# Patient Record
Sex: MALE | Race: WHITE | NOT HISPANIC OR LATINO
[De-identification: names, ages, dates, MRNs, and addresses within clinical notes are randomized per-mention and may not be internally consistent; named-entity substitution may affect disease eponyms.]

---

## 2017-02-08 ENCOUNTER — TRANSCRIPTION ENCOUNTER (OUTPATIENT)
Age: 27
End: 2017-02-08

## 2017-04-13 PROBLEM — Z00.00 ENCOUNTER FOR PREVENTIVE HEALTH EXAMINATION: Status: ACTIVE | Noted: 2017-04-13

## 2017-05-08 ENCOUNTER — APPOINTMENT (OUTPATIENT)
Dept: VASCULAR SURGERY | Facility: CLINIC | Age: 27
End: 2017-05-08

## 2017-05-08 VITALS — BODY MASS INDEX: 28.14 KG/M2 | HEIGHT: 69 IN | WEIGHT: 190 LBS

## 2017-05-08 DIAGNOSIS — Z86.69 PERSONAL HISTORY OF OTHER DISEASES OF THE NERVOUS SYSTEM AND SENSE ORGANS: ICD-10-CM

## 2017-05-08 RX ORDER — ACETAMINOPHEN 325 MG/1
325 TABLET, FILM COATED ORAL
Refills: 0 | Status: ACTIVE | COMMUNITY

## 2017-05-08 RX ORDER — CLONAZEPAM 0.5 MG/1
0.5 TABLET ORAL
Refills: 0 | Status: ACTIVE | COMMUNITY

## 2017-05-08 RX ORDER — DIVALPROEX SODIUM 125 MG
125 CAPSULE, DELAYED RELEASE SPRINKLE ORAL
Refills: 0 | Status: ACTIVE | COMMUNITY

## 2017-12-14 ENCOUNTER — APPOINTMENT (OUTPATIENT)
Dept: VASCULAR SURGERY | Facility: CLINIC | Age: 27
End: 2017-12-14
Payer: MEDICARE

## 2017-12-14 PROCEDURE — 99212 OFFICE O/P EST SF 10 MIN: CPT

## 2018-01-18 ENCOUNTER — APPOINTMENT (OUTPATIENT)
Dept: VASCULAR SURGERY | Facility: CLINIC | Age: 28
End: 2018-01-18
Payer: MEDICARE

## 2018-01-18 VITALS — HEIGHT: 68 IN

## 2018-01-18 PROCEDURE — 99212 OFFICE O/P EST SF 10 MIN: CPT

## 2018-05-07 ENCOUNTER — APPOINTMENT (OUTPATIENT)
Dept: VASCULAR SURGERY | Facility: CLINIC | Age: 28
End: 2018-05-07
Payer: MEDICARE

## 2018-05-07 VITALS
DIASTOLIC BLOOD PRESSURE: 80 MMHG | BODY MASS INDEX: 34.36 KG/M2 | HEIGHT: 70 IN | WEIGHT: 240 LBS | SYSTOLIC BLOOD PRESSURE: 118 MMHG

## 2018-05-07 VITALS
BODY MASS INDEX: 34.36 KG/M2 | WEIGHT: 240 LBS | HEIGHT: 70 IN | DIASTOLIC BLOOD PRESSURE: 80 MMHG | SYSTOLIC BLOOD PRESSURE: 118 MMHG

## 2018-05-07 PROCEDURE — 99213 OFFICE O/P EST LOW 20 MIN: CPT

## 2018-06-04 ENCOUNTER — EMERGENCY (EMERGENCY)
Facility: HOSPITAL | Age: 28
LOS: 0 days | Discharge: HOME | End: 2018-06-04
Attending: EMERGENCY MEDICINE | Admitting: EMERGENCY MEDICINE

## 2018-06-04 VITALS
HEART RATE: 83 BPM | WEIGHT: 233.25 LBS | RESPIRATION RATE: 18 BRPM | OXYGEN SATURATION: 97 % | TEMPERATURE: 97 F | DIASTOLIC BLOOD PRESSURE: 62 MMHG | SYSTOLIC BLOOD PRESSURE: 125 MMHG

## 2018-06-04 DIAGNOSIS — S50.812A ABRASION OF LEFT FOREARM, INITIAL ENCOUNTER: ICD-10-CM
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
## 2018-06-04 NOTE — ED ADULT NURSE NOTE - OBJECTIVE STATEMENT
27 y.o M  s/p fall uing cane to ambulate fell going up a ramp today . Pt wearing helmet no head trauma , no LOC. c/o R knee pain

## 2018-06-04 NOTE — ED PROVIDER NOTE - OBJECTIVE STATEMENT
28 y/o male with pmhx of CP, seizures, TBI, R hemiplegia presents s/p fall. per patient, he was going up ramp, lost his balance and fell on to left side, denies head trauma, LOC; admits to wearing head helmet. Patient states his left forearm has some abrasions and is burning; denies any other pain.

## 2018-06-04 NOTE — ED ADULT NURSE NOTE - CHPI ED SYMPTOMS NEG
no numbness/no loss of consciousness/no bleeding/no tingling/no vomiting/no fever/no weakness/no confusion/no deformity

## 2018-06-04 NOTE — ED PROVIDER NOTE - PHYSICAL EXAMINATION
CONSTITUTIONAL: Well-developed; well-nourished; in no acute distress.   SKIN: +1cm abrasion to left forearm, no bleeding   EXT: Normal left forearm ROM.    NEURO: Alert, oriented, grossly unremarkable  PSYCH: Cooperative, appropriate.

## 2018-06-04 NOTE — ED PROVIDER NOTE - NS ED ROS FT
Constitutional: See HPI.  MS: +forearm abrasion; No myalgia, muscle weakness, joint pain or back pain.  Neuro: No numbness/tingling  Skin: +abrasion

## 2018-06-04 NOTE — ED PROVIDER NOTE - PROGRESS NOTE DETAILS
Attending note:  I personally evaluated the patient. I reviewed the Resident’s note (as assigned above), and agree with the findings and plan except as documented in my note.   28 y/o M with PMH of CP, TBI with residual R-sided weakness, seizures, BIB health aide for evaluation s/p mechanical slip and fall. Pt uses a cane to ambulate at baseline and today was going up a ramp, lost his balance, and fell onto his side. Pt was wearing a helmet and had no head trauma, LOC, or seizure-like activity. Pt only complains of R knee pain and multiple abrasions to his L arm. Pt has been able to ambulate and acting at his baseline since per health aide at bedside. Denies nausea or vomiting.   Exam: Pt in NAD, AAOx3, head NC/AT, normal gait, lungs CTA B/L, CV S1S2 regular, abdomen soft/NT/ND/(+)BS, ext (-) edema, no pain over arms or hands, no clavicular tenderness, (+)small abrasion to L posterior forearm, motor 5/5x4, sensation intact.   Plan: Pt stable for D/C. Attending note:  I personally evaluated the patient. I reviewed the Resident’s note (as assigned above), and agree with the findings and plan except as documented in my note.   28 y/o M with PMH of CP, TBI with residual R-sided weakness, seizures, BIB health aide for evaluation s/p mechanical slip and fall. Pt uses a cane to ambulate at baseline and today was going up a ramp, lost his balance, and fell onto his side. Pt was wearing a helmet and had no head trauma, LOC, or seizure-like activity. Pt only complains of R knee pain and multiple abrasions to his L arm. Pt has been able to ambulate and acting at his baseline since per health aide at bedside. Denies nausea or vomiting. Denies knee pain now.  Exam: Pt in NAD, alert and awake, head NC/AT, lungs CTA B/L, CV S1S2 regular, abdomen soft/NT/ND/(+)BS, ext no pain over arms or hands, no deformity, no clavicular tenderness, (+)small abrasion to L posterior forearm, ambulating at baseline.   Plan: Pt stable for D/C.

## 2018-07-19 ENCOUNTER — APPOINTMENT (OUTPATIENT)
Dept: VASCULAR SURGERY | Facility: CLINIC | Age: 28
End: 2018-07-19
Payer: MEDICARE

## 2018-07-19 VITALS — SYSTOLIC BLOOD PRESSURE: 122 MMHG | DIASTOLIC BLOOD PRESSURE: 80 MMHG

## 2018-07-19 PROCEDURE — 99212 OFFICE O/P EST SF 10 MIN: CPT

## 2018-10-16 ENCOUNTER — EMERGENCY (EMERGENCY)
Facility: HOSPITAL | Age: 28
LOS: 0 days | Discharge: HOME | End: 2018-10-17
Attending: EMERGENCY MEDICINE | Admitting: PHYSICIAN ASSISTANT

## 2018-10-16 VITALS
SYSTOLIC BLOOD PRESSURE: 132 MMHG | DIASTOLIC BLOOD PRESSURE: 82 MMHG | HEART RATE: 82 BPM | OXYGEN SATURATION: 97 % | TEMPERATURE: 98 F | RESPIRATION RATE: 20 BRPM

## 2018-10-16 VITALS — WEIGHT: 225.09 LBS | HEIGHT: 72 IN
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
## 2018-10-16 DIAGNOSIS — S51.812A LACERATION WITHOUT FOREIGN BODY OF LEFT FOREARM, INITIAL ENCOUNTER: ICD-10-CM
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
## 2018-10-16 DIAGNOSIS — S50.312A ABRASION OF LEFT ELBOW, INITIAL ENCOUNTER: ICD-10-CM

## 2018-10-16 DIAGNOSIS — S30.811A ABRASION OF ABDOMINAL WALL, INITIAL ENCOUNTER: ICD-10-CM
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
## 2018-10-16 DIAGNOSIS — M79.602 PAIN IN LEFT ARM: ICD-10-CM

## 2018-10-16 DIAGNOSIS — Z23 ENCOUNTER FOR IMMUNIZATION: ICD-10-CM

## 2018-10-16 DIAGNOSIS — Z88.8 ALLERGY STATUS TO OTHER DRUGS, MEDICAMENTS AND BIOLOGICAL SUBSTANCES: ICD-10-CM

## 2018-10-16 LAB
ALBUMIN SERPL ELPH-MCNC: 4.4 G/DL — SIGNIFICANT CHANGE UP (ref 3.5–5.2)
ALP SERPL-CCNC: 78 U/L — SIGNIFICANT CHANGE UP (ref 30–115)
ALT FLD-CCNC: 64 U/L — HIGH (ref 0–41)
ANION GAP SERPL CALC-SCNC: 16 MMOL/L — HIGH (ref 7–14)
AST SERPL-CCNC: 68 U/L — HIGH (ref 0–41)
BILIRUB SERPL-MCNC: 0.4 MG/DL — SIGNIFICANT CHANGE UP (ref 0.2–1.2)
BUN SERPL-MCNC: 16 MG/DL — SIGNIFICANT CHANGE UP (ref 10–20)
CALCIUM SERPL-MCNC: 9.7 MG/DL — SIGNIFICANT CHANGE UP (ref 8.5–10.1)
CHLORIDE SERPL-SCNC: 99 MMOL/L — SIGNIFICANT CHANGE UP (ref 98–110)
CO2 SERPL-SCNC: 27 MMOL/L — SIGNIFICANT CHANGE UP (ref 17–32)
CREAT SERPL-MCNC: 0.8 MG/DL — SIGNIFICANT CHANGE UP (ref 0.7–1.5)
GLUCOSE SERPL-MCNC: 139 MG/DL — HIGH (ref 70–99)
HCT VFR BLD CALC: 37.1 % — LOW (ref 42–52)
HGB BLD-MCNC: 13.1 G/DL — LOW (ref 14–18)
MCHC RBC-ENTMCNC: 34.9 PG — HIGH (ref 27–31)
MCHC RBC-ENTMCNC: 35.3 G/DL — SIGNIFICANT CHANGE UP (ref 32–37)
MCV RBC AUTO: 98.9 FL — HIGH (ref 80–94)
NRBC # BLD: 0 /100 WBCS — SIGNIFICANT CHANGE UP (ref 0–0)
PLATELET # BLD AUTO: 160 K/UL — SIGNIFICANT CHANGE UP (ref 130–400)
POTASSIUM SERPL-MCNC: 3.6 MMOL/L — SIGNIFICANT CHANGE UP (ref 3.5–5)
POTASSIUM SERPL-SCNC: 3.6 MMOL/L — SIGNIFICANT CHANGE UP (ref 3.5–5)
PROT SERPL-MCNC: 6.5 G/DL — SIGNIFICANT CHANGE UP (ref 6–8)
RBC # BLD: 3.75 M/UL — LOW (ref 4.7–6.1)
RBC # FLD: 12.5 % — SIGNIFICANT CHANGE UP (ref 11.5–14.5)
SODIUM SERPL-SCNC: 142 MMOL/L — SIGNIFICANT CHANGE UP (ref 135–146)
WBC # BLD: 11.07 K/UL — HIGH (ref 4.8–10.8)
WBC # FLD AUTO: 11.07 K/UL — HIGH (ref 4.8–10.8)

## 2018-10-16 RX ORDER — TETANUS TOXOID, REDUCED DIPHTHERIA TOXOID AND ACELLULAR PERTUSSIS VACCINE, ADSORBED 5; 2.5; 8; 8; 2.5 [IU]/.5ML; [IU]/.5ML; UG/.5ML; UG/.5ML; UG/.5ML
0.5 SUSPENSION INTRAMUSCULAR ONCE
Qty: 0 | Refills: 0 | Status: COMPLETED | OUTPATIENT
Start: 2018-10-16 | End: 2018-10-16

## 2018-10-16 RX ORDER — METHOCARBAMOL 500 MG/1
750 TABLET, FILM COATED ORAL ONCE
Qty: 0 | Refills: 0 | Status: COMPLETED | OUTPATIENT
Start: 2018-10-16 | End: 2018-10-16

## 2018-10-16 RX ORDER — ACETAMINOPHEN 500 MG
650 TABLET ORAL ONCE
Qty: 0 | Refills: 0 | Status: COMPLETED | OUTPATIENT
Start: 2018-10-16 | End: 2018-10-16

## 2018-10-16 RX ADMIN — TETANUS TOXOID, REDUCED DIPHTHERIA TOXOID AND ACELLULAR PERTUSSIS VACCINE, ADSORBED 0.5 MILLILITER(S): 5; 2.5; 8; 8; 2.5 SUSPENSION INTRAMUSCULAR at 21:00

## 2018-10-16 RX ADMIN — Medication 650 MILLIGRAM(S): at 21:42

## 2018-10-16 RX ADMIN — METHOCARBAMOL 750 MILLIGRAM(S): 500 TABLET, FILM COATED ORAL at 21:45

## 2018-10-16 NOTE — ED ADULT NURSE NOTE - NSIMPLEMENTINTERV_GEN_ALL_ED
Implemented All Fall with Harm Risk Interventions:  [place] to call system. Call bell, personal items and telephone within reach. Instruct patient to call for assistance. Room bathroom lighting operational. Non-slip footwear when patient is off stretcher. Physically safe environment: no spills, clutter or unnecessary equipment. Stretcher in lowest position, wheels locked, appropriate side rails in place. Provide visual cue, wrist band, yellow gown, etc. Monitor gait and stability. Monitor for mental status changes and reorient to person, place, and time. Review medications for side effects contributing to fall risk. Reinforce activity limits and safety measures with patient and family. Provide visual clues: red socks.

## 2018-10-16 NOTE — ED ADULT NURSE NOTE - OBJECTIVE STATEMENT
Patient [place] using [place] the mall when he lost his footing and fell down 7 stairs. Patient has obvious abrasions on his left elbow and buttocks but denies any pain

## 2018-10-16 NOTE — ED ADULT TRIAGE NOTE - CHIEF COMPLAINT QUOTE
pt c/o mechanical fall down escalator steps, falling down 7 stairs; abrasion to left buttocks and left forearm; did not hit head no LOC

## 2018-10-17 NOTE — ED PROVIDER NOTE - NS ED ROS FT
Review of Systems:  	•	CONSTITUTIONAL - no fever, no diaphoresis, no chills  	•	SKIN - no rash  •	EYES - no eye pain, no blurry vision  	•	ENT - no change in hearing, no sore throat, no ear pain or tinnitus  	•	RESPIRATORY - no shortness of breath, no cough  	•	CARDIAC - no chest pain, no palpitations  	•	GI - no abd pain, no nausea, no vomiting, no diarrhea, no constipation  	•	GENITO-URINARY - no discharge, no dysuria; no hematuria, no increased urinary frequency  	•	MUSCULOSKELETAL -  + left arm and left flank pain   	•	NEUROLOGIC - no weakness, no headache, no paresthesias, no LOC

## 2018-10-17 NOTE — ED PROVIDER NOTE - OBJECTIVE STATEMENT
28 year old male with pmhx noted, presents s/p fall.  as per pt and aide pt was in mall and fell down 7 escalator steps, hit left arm and left flank on escalator. Pt complaining of pain to left arm and flank. No head trauma, LOC, neck or back pain, paresthesias or weakness, HA, dizziness, or N/V.

## 2018-10-17 NOTE — ED PROVIDER NOTE - PHYSICAL EXAMINATION
CONST: Well appearing in NAD  EYES: PERRL, EOMI, Sclera and conjunctiva clear.   ENT: No nasal discharge. TM's clear B/L without drainage. Oropharynx normal appearing, no erythema or exudates. Uvula midline.  NECK: Non-tender, normal ROM   CARD: Normal S1 S2; Normal rate and rhythm  RESP: Equal BS B/L, No wheezes, rhonchi or rales. No distress  GI: Soft, non-tender, non-distended. normal BS  MS: Normal ROM in all extremities. No midline spinal tenderness.  SKIN: + linear laceration/abrasion to left medial forearm, measuring approx 5 cm, + multiple abrasions to left elbow and left flank, no ecchymosis   NEURO: A&Ox3, No focal deficits. Strength 5/5 with no sensory deficits. Steady gait

## 2018-10-26 ENCOUNTER — EMERGENCY (EMERGENCY)
Facility: HOSPITAL | Age: 28
LOS: 0 days | Discharge: HOME | End: 2018-10-26
Admitting: PHYSICIAN ASSISTANT

## 2018-10-26 VITALS
TEMPERATURE: 97 F | DIASTOLIC BLOOD PRESSURE: 58 MMHG | SYSTOLIC BLOOD PRESSURE: 120 MMHG | OXYGEN SATURATION: 96 % | HEART RATE: 85 BPM | RESPIRATION RATE: 18 BRPM

## 2018-10-26 DIAGNOSIS — S51.812A LACERATION WITHOUT FOREIGN BODY OF LEFT FOREARM, INITIAL ENCOUNTER: ICD-10-CM
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
## 2018-10-26 DIAGNOSIS — Z88.8 ALLERGY STATUS TO OTHER DRUGS, MEDICAMENTS AND BIOLOGICAL SUBSTANCES: ICD-10-CM

## 2018-10-26 NOTE — ED PROVIDER NOTE - PHYSICAL EXAMINATION
Physical Exam    Vital Signs: I have reviewed the initial vital signs.  Constitutional: well-nourished, appears stated age, no acute distress  Skin: 5 sutures to left forearm. Healing well.   Neuro: AOx3,

## 2018-10-26 NOTE — ED PROVIDER NOTE - OBJECTIVE STATEMENT
arie from patient and  staff  29 yo M here for suture removal. Sutures to left forearm placed 9 days ago. No complaints.

## 2018-10-26 NOTE — ED PROVIDER NOTE - NS ED ROS FT
Constitutional: (-) fever  Skin: (+) sutures to left forearm  Neurological: (-) altered mental status

## 2018-10-26 NOTE — ED ADULT NURSE NOTE - OBJECTIVE STATEMENT
Pt with laceration on the left forearm from 9 days ago  coming for sutures removals ,incisions dry intake no redness or edema noted healing well

## 2019-05-17 ENCOUNTER — EMERGENCY (EMERGENCY)
Facility: HOSPITAL | Age: 29
LOS: 0 days | Discharge: HOME | End: 2019-05-17
Attending: EMERGENCY MEDICINE | Admitting: EMERGENCY MEDICINE
Payer: MEDICARE

## 2019-05-17 VITALS
SYSTOLIC BLOOD PRESSURE: 133 MMHG | DIASTOLIC BLOOD PRESSURE: 84 MMHG | OXYGEN SATURATION: 97 % | RESPIRATION RATE: 18 BRPM | HEART RATE: 88 BPM | TEMPERATURE: 97 F
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
## 2019-05-17 DIAGNOSIS — S92.401A DISPLACED UNSPECIFIED FRACTURE OF RIGHT GREAT TOE, INITIAL ENCOUNTER FOR CLOSED FRACTURE: ICD-10-CM
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
## 2019-05-17 PROCEDURE — 73660 X-RAY EXAM OF TOE(S): CPT | Mod: 26,RT

## 2019-05-17 PROCEDURE — 99284 EMERGENCY DEPT VISIT MOD MDM: CPT | Mod: GC

## 2019-05-17 RX ORDER — TETANUS TOXOID, REDUCED DIPHTHERIA TOXOID AND ACELLULAR PERTUSSIS VACCINE, ADSORBED 5; 2.5; 8; 8; 2.5 [IU]/.5ML; [IU]/.5ML; UG/.5ML; UG/.5ML; UG/.5ML
0.5 SUSPENSION INTRAMUSCULAR ONCE
Refills: 0 | Status: COMPLETED | OUTPATIENT
Start: 2019-05-17 | End: 2019-05-17

## 2019-05-17 RX ADMIN — TETANUS TOXOID, REDUCED DIPHTHERIA TOXOID AND ACELLULAR PERTUSSIS VACCINE, ADSORBED 0.5 MILLILITER(S): 5; 2.5; 8; 8; 2.5 SUSPENSION INTRAMUSCULAR at 12:27

## 2019-05-17 NOTE — ED PROVIDER NOTE - OBJECTIVE STATEMENT
27 yo M with hx of cerebral palsy, R hemiplegia, TBI, seizure who presents with R toe pain x 11 days. Pt slipped in the shower 11 days ago and hit his R toe against the ground. No head trauma or LOC. No preceding cp, sob, palpitations, dizziness prior to fall. Has scab to R toe which he has been applying ointment. No purulent discharge, increased swelling, fever, chills, nausea, numbness, tingling. Has chronic swelling and erythema in RLE/R foot at baseline which are not changed. No hx of DM. 27 yo M with hx of cerebral palsy, R hemiplegia, TBI, seizure who presents with R toe pain x 11 days. Pt slipped in the shower 11 days ago and hit his R toe against the ground. No head trauma or LOC. No preceding cp, sob, palpitations, dizziness prior to fall. Has scab to R toe which he has been applying ointment and has seen his podiatrist at [facility]. No purulent discharge, increased swelling, fever, chills, nausea, numbness, tingling. Has chronic swelling and erythema in RLE/R foot at baseline which are not changed. No hx of DM. Tetanus status unknown. 27 yo M with hx of cerebral palsy, R hemiplegia, TBI, seizure who presents from [facility] with R toe pain x 11 days. Per RN Natalie from , pt slipped in the shower 11 days ago and hit his R toe against the ground. No head trauma or LOC. No preceding cp, sob, palpitations, dizziness prior to fall. Had an outpt xray done and was told he had a fx. Pt then went to [facility] and had a repeat xray and was told he did not have a fx. He was instructed to elevate his foot and ice it. Has scab to R big toe to which he has been applying ointment. Pt saw his PMD yesterday and she was concerned about the swelling and the contradicting xray readings so instructed him to come to the ED. Per pt and RN, pt has chronic swelling and erythema in RLE/R foot at baseline which are not changed. No purulent discharge, increased swelling, fever, chills, nausea, numbness, tingling. No hx of DM. Tetanus status unknown.

## 2019-05-17 NOTE — ED PROVIDER NOTE - PHYSICAL EXAMINATION
CONSTITUTIONAL: well developed, nontoxic appearing, in no acute distress, speaking in full sentences  SKIN: warm, dry, no rash, cap refill < 2 seconds  HEENT: normocephalic, atraumatic, no conjunctival erythema, moist mucous membranes, patent airway  NECK: supple, no masses  CV:  regular rate, regular rhythm, 2+ DP pulses bilaterally  RESP: normal work of breathing  ABD: soft, nontender, nondistended,   MSK: normal ROM, no cyanosis, pitting edema to RLE extending to foot, overlying erythema, superficial abrasion to R toe with overlying scab, no discharge, no fluctuance, no induration, no vesicles, good cap refill, sensation intact to touch, normal ROM in toes/ankles  NEURO: alert, oriented, grossly unremarkable  PSYCH: cooperative, appropriate

## 2019-05-17 NOTE — ED ADULT NURSE NOTE - OBJECTIVE STATEMENT
Pt presents to the ED with c/o right big toe swelling and redness from wound pt got 1 week ago. Pt denies fevers.

## 2019-05-17 NOTE — ED PROVIDER NOTE - NSFOLLOWUPINSTRUCTIONS_ED_ALL_ED_FT
Toe Fracture  ImageA toe fracture is a break in one of the toe bones (phalanges).    What are the causes?  This condition may be caused by:    Dropping a heavy object on your toe.  Stubbing your toe.  Overusing your toe or doing repetitive exercise.  Twisting or stretching your toe out of place.    What increases the risk?  This condition is more likely to develop in people who:    Play contact sports.  Have a bone disease.  Have a low calcium level.    What are the signs or symptoms?  The main symptoms of this condition are swelling and pain in the toe. The pain may get worse with standing or walking. Other symptoms include:    Bruising.  Stiffness.  Numbness.  A change in the way the toe looks.  Broken bones that poke through the skin.  Blood beneath the toenail.    How is this diagnosed?  This condition is diagnosed with a physical exam. You may also have X-rays.    How is this treated?  Treatment for this condition depends on the type of fracture and its severity. Treatment may involve:    Taping the broken toe to a toe that is next to it (maeve taping). This is the most common treatment for fractures in which the bone has not moved out of place (nondisplaced fracture).  Wearing a shoe that has a wide, rigid sole to protect the toe and to limit its movement.  Wearing a walking cast.  Having a procedure to move the toe back into place.  Surgery. This may be needed:    If there are many pieces of broken bone that are out of place (displaced).  If the toe joint breaks.  If the bone breaks through the skin.    Physical therapy. This is done to help regain movement and strength in the toe.    You may need follow-up X-rays to make sure that the bone is healing well and staying in position.    Follow these instructions at home:  If you have a cast:     Do not stick anything inside the cast to scratch your skin. Doing that increases your risk of infection.  Check the skin around the cast every day. Report any concerns to your health care provider. You may put lotion on dry skin around the edges of the cast. Do not apply lotion to the skin underneath the cast.  Do not put pressure on any part of the cast until it is fully hardened. This may take several hours.  Keep the cast clean and dry.  Bathing     Do not take baths, swim, or use a hot tub until your health care provider approves. Ask your health care provider if you can take showers. You may only be allowed to take sponge baths for bathing.  If your health care provider approves bathing and showering, cover the cast or bandage (dressing) with a watertight plastic bag to protect it from water. Do not let the cast or dressing get wet.  Managing pain, stiffness, and swelling     If you do not have a cast, apply ice to the injured area, if directed.    Put ice in a plastic bag.  Place a towel between your skin and the bag.  Leave the ice on for 20 minutes, 2–3 times per day.    Move your toes often to avoid stiffness and to lessen swelling.  Raise (elevate) the injured area above the level of your heart while you are sitting or lying down.  Driving     Do not drive or operate heavy machinery while taking pain medicine.  Do not drive while wearing a cast on a foot that you use for driving.  Activity     Return to your normal activities as directed by your health care provider. Ask your health care provider what activities are safe for you.  Perform exercises daily as directed by your health care provider or physical therapist.  Safety     Do not use the injured limb to support your body weight until your health care provider says that you can. Use crutches or other assistive devices as directed by your health care provider.  General instructions     If your toe was treated with maeve taping, follow your health care provider's instructions for changing the gauze and tape. Change it more often:    The gauze and tape get wet. If this happens, dry the space between the toes.  The gauze and tape are too tight and cause your toe to become pale or numb.    Wear a protective shoe as directed by your health care provider. If you were not given a protective shoe, wear sturdy, supportive shoes. Your shoes should not pinch your toes and should not fit tightly against your toes.  Do not use any tobacco products, including cigarettes, chewing tobacco, or e-cigarettes. Tobacco can delay bone healing. If you need help quitting, ask your health care provider.  Take medicines only as directed by your health care provider.  Keep all follow-up visits as directed by your health care provider. This is important.  Contact a health care provider if:  You have a fever.  Your pain medicine is not helping.  Your toe is cold.  Your toe is numb.  You still have pain after one week of rest and treatment.  You still have pain after your health care provider has said that you can start walking again.  You have pain, tingling, or numbness in your foot that is not going away.  Get help right away if:  You have severe pain.  You have redness or inflammation in your toe that is getting worse.  You have pain or numbness in your toe that is getting worse.  Your toe turns blue.  This information is not intended to replace advice given to you by your health care provider. Make sure you discuss any questions you have with your health care provider.

## 2019-05-17 NOTE — ED PROVIDER NOTE - ATTENDING CONTRIBUTION TO CARE
I personally evaluated the patient. I reviewed the Resident’s or Physician Assistant’s note (as assigned above), and agree with the findings and plan except as documented in my note.  29 yo man with right toe injury.  Was sent here due to swelling and multiple different opinions regarding whether there is a fracture or not.  XRAY here shows fracture.  Already has walking boot.  Will refer to podiatry clinic and discharge home.

## 2019-05-17 NOTE — ED PROVIDER NOTE - NSFOLLOWUPCLINICS_GEN_ALL_ED_FT
[facility]  Podiatry  .  [place]   Phone: [phone]  Fax:   Follow Up Time: 1-3 Days

## 2019-05-17 NOTE — ED PROVIDER NOTE - CLINICAL SUMMARY MEDICAL DECISION MAKING FREE TEXT BOX
29 yo man with recent toe injury presents for reassessment in ED after there was some confusion about whether he had a fracture.  XRAY confirmed fracture.  Reassurance, immobilization and outpatient podiatry follow up.

## 2019-05-17 NOTE — ED PROVIDER NOTE - CARE PROVIDER_API CALL
Elias Little (DPM)  Surgery  [address] [place] [address]  Phone: [phone]  Fax: [phone]  Follow Up Time: 1-3 Days

## 2019-05-17 NOTE — ED ADULT TRIAGE NOTE - CHIEF COMPLAINT QUOTE
"I have this wound on my right toe from when I fell in the shower last week. It looks infected."  (+) redness and swelling to right foot. (+) right leg swelling.

## 2019-05-17 NOTE — ED PROVIDER NOTE - PROGRESS NOTE DETAILS
29 yo M with hx of cerebral palsy, R hemiplegia, TBI, seizure who presents with R toe wound x 11 days. Has been seen by podiatrist at [facility] and receiving woundcare. Neurovascularly intact. No s/sx of infection. Ordered xray. Will update Tdap. Spoke with BREE Estrada from [facility] who provided collateral as included above in HPI. 29 yo M with hx of cerebral palsy, R hemiplegia, TBI, seizure who presents with R toe wound x 11 days. Had conflicting xrays regarding fx. Neurovascularly intact. No s/sx of infection. Ordered xray. Will update Tdap. Xray shows displaced interarticular fracture involving the proximal phalanx of the great toe. Pt already has hard sole boot. Instructed on RICE and NSAIDs for analgesia. Given podiatry f/u. Pt and aid verbalized understanding and were agreeable with the plan.

## 2019-05-20 ENCOUNTER — APPOINTMENT (OUTPATIENT)
Dept: PODIATRY | Facility: CLINIC | Age: 29
End: 2019-05-20
Payer: MEDICARE

## 2019-05-20 ENCOUNTER — OUTPATIENT (OUTPATIENT)
Dept: OUTPATIENT SERVICES | Facility: HOSPITAL | Age: 29
LOS: 1 days | Discharge: HOME | End: 2019-05-20

## 2019-05-20 VITALS
HEIGHT: 70 IN | BODY MASS INDEX: 34.36 KG/M2 | DIASTOLIC BLOOD PRESSURE: 80 MMHG | SYSTOLIC BLOOD PRESSURE: 118 MMHG | WEIGHT: 240 LBS | HEART RATE: 83 BPM

## 2019-05-20 PROBLEM — S06.9X9A UNSPECIFIED INTRACRANIAL INJURY WITH LOSS OF CONSCIOUSNESS OF UNSPECIFIED DURATION, INITIAL ENCOUNTER: Chronic | Status: ACTIVE | Noted: 2019-05-17

## 2019-05-20 PROBLEM — R56.9 UNSPECIFIED CONVULSIONS: Chronic | Status: ACTIVE | Noted: 2019-05-17

## 2019-05-20 PROBLEM — G81.91 HEMIPLEGIA, UNSPECIFIED AFFECTING RIGHT DOMINANT SIDE: Chronic | Status: ACTIVE | Noted: 2019-05-17

## 2019-05-20 PROBLEM — G80.9 CEREBRAL PALSY, UNSPECIFIED: Chronic | Status: ACTIVE | Noted: 2019-05-17

## 2019-05-20 PROCEDURE — 99203 OFFICE O/P NEW LOW 30 MIN: CPT

## 2019-05-21 NOTE — HISTORY OF PRESENT ILLNESS
[FreeTextEntry1] : PAtient seen in clinic S/P falling in shower two weeks prior \par Patient complained of pain and was brought to ED on 5/17/19 \par Xray reveal interarticular fracture of right Proximal phalanx

## 2019-05-21 NOTE — PROCEDURE
[FreeTextEntry1] : Patient examined, history and chart reviewed\par patient will not rwuire surgical intervention at this time \par patient can continue wearing Darco shoe for the next 8 weeks\par No dressing required and patient can resume normal activity as long as he is wearing appropriate shoe gear (Darco shoe that was dispensed) \par Patient can ambulate as tolerated with out restrictions. \par follow up in 8 weeks for reevaluation of fracture

## 2019-05-21 NOTE — PHYSICAL EXAM
[General Appearance - Alert] : alert [General Appearance - In No Acute Distress] : in no acute distress [Full Pulse] : the pedal pulses are present [Edema] : there was no peripheral edema [Abnormal Walk] : normal gait [Nail Clubbing] : no clubbing  or cyanosis of the fingernails [Musculoskeletal - Swelling] : no joint swelling seen [Motor Tone] : muscle strength and tone were normal [Skin Color & Pigmentation] : normal skin color and pigmentation [Skin Turgor] : normal skin turgor [] : no rash [Deep Tendon Reflexes (DTR)] : deep tendon reflexes were 2+ and symmetric [Sensation] : the sensory exam was normal to light touch and pinprick [No Focal Deficits] : no focal deficits [FreeTextEntry1] : slight pain on palpation of right hallux at Proximal phalanx

## 2019-07-18 ENCOUNTER — APPOINTMENT (OUTPATIENT)
Dept: VASCULAR SURGERY | Facility: CLINIC | Age: 29
End: 2019-07-18

## 2019-08-02 ENCOUNTER — OUTPATIENT (OUTPATIENT)
Dept: OUTPATIENT SERVICES | Facility: HOSPITAL | Age: 29
LOS: 1 days | Discharge: HOME | End: 2019-08-02

## 2019-08-02 ENCOUNTER — OUTPATIENT (OUTPATIENT)
Dept: OUTPATIENT SERVICES | Facility: HOSPITAL | Age: 29
LOS: 1 days | Discharge: HOME | End: 2019-08-02
Payer: COMMERCIAL

## 2019-08-02 ENCOUNTER — APPOINTMENT (OUTPATIENT)
Dept: PODIATRY | Facility: CLINIC | Age: 29
End: 2019-08-02
Payer: MEDICARE

## 2019-08-02 VITALS
BODY MASS INDEX: 32.5 KG/M2 | WEIGHT: 227 LBS | HEART RATE: 97 BPM | SYSTOLIC BLOOD PRESSURE: 115 MMHG | HEIGHT: 70 IN | DIASTOLIC BLOOD PRESSURE: 79 MMHG

## 2019-08-02 DIAGNOSIS — S92.911A UNSPECIFIED FRACTURE OF RIGHT TOE(S), INITIAL ENCOUNTER FOR CLOSED FRACTURE: ICD-10-CM

## 2019-08-02 PROCEDURE — 73630 X-RAY EXAM OF FOOT: CPT | Mod: 26,RT

## 2019-08-02 PROCEDURE — 99213 OFFICE O/P EST LOW 20 MIN: CPT

## 2019-08-14 NOTE — PROCEDURE
[FreeTextEntry1] : Patient examined, history and chart reviewed\par patient will not require surgical intervention at this time \par patient can continue wearing Darco shoe\par No dressing required and patient can resume normal activity as long as he is wearing appropriate shoe gear (Darco shoe that was dispensed) \par Patient can ambulate as tolerated with out restrictions. \par Rx Xray\par Pt. to RTC in 2 weeks to review Xray results

## 2019-08-14 NOTE — HISTORY OF PRESENT ILLNESS
[FreeTextEntry1] : PAtient seen in clinic S/P falling in May \par Patient complained of pain and was brought to ED on 5/17/19 \par Xray reveal interarticular fracture of right Proximal phalanx. Pt states that he is not in any pain anymore.

## 2019-08-16 ENCOUNTER — APPOINTMENT (OUTPATIENT)
Dept: PODIATRY | Facility: CLINIC | Age: 29
End: 2019-08-16

## 2019-08-16 DIAGNOSIS — M79.89 OTHER SPECIFIED SOFT TISSUE DISORDERS: ICD-10-CM

## 2019-08-16 DIAGNOSIS — Z02.9 ENCOUNTER FOR ADMINISTRATIVE EXAMINATIONS, UNSPECIFIED: ICD-10-CM

## 2019-08-16 DIAGNOSIS — S92.911A UNSPECIFIED FRACTURE OF RIGHT TOE(S), INITIAL ENCOUNTER FOR CLOSED FRACTURE: ICD-10-CM

## 2019-08-22 ENCOUNTER — OUTPATIENT (OUTPATIENT)
Dept: OUTPATIENT SERVICES | Facility: HOSPITAL | Age: 29
LOS: 1 days | Discharge: HOME | End: 2019-08-22

## 2019-08-22 ENCOUNTER — APPOINTMENT (OUTPATIENT)
Dept: PODIATRY | Facility: CLINIC | Age: 29
End: 2019-08-22
Payer: MEDICARE

## 2019-08-22 VITALS — WEIGHT: 227 LBS | HEIGHT: 70 IN | BODY MASS INDEX: 32.5 KG/M2

## 2019-08-22 DIAGNOSIS — S92.911A UNSPECIFIED FRACTURE OF RIGHT TOE(S), INITIAL ENCOUNTER FOR CLOSED FRACTURE: ICD-10-CM

## 2019-08-22 PROCEDURE — 99213 OFFICE O/P EST LOW 20 MIN: CPT

## 2019-08-27 PROBLEM — S92.911A FRACTURE OF TOE OF RIGHT FOOT: Status: ACTIVE | Noted: 2019-05-20

## 2019-08-27 NOTE — PROCEDURE
[FreeTextEntry1] : Patient examined, history and chart reviewed\par patient will not require surgical intervention at this time \par d/c darco right foot\par right foot xray reviewed discussed in detail with patient, healing/consolidating fracture site right foot\par pt to go back to compression stocking with brace device w regular sneakers\par f/u as needed

## 2019-08-27 NOTE — PHYSICAL EXAM
[General Appearance - Alert] : alert [General Appearance - In No Acute Distress] : in no acute distress [Full Pulse] : the pedal pulses are present [Edema] : there was no peripheral edema [Abnormal Walk] : normal gait [Nail Clubbing] : no clubbing  or cyanosis of the fingernails [Musculoskeletal - Swelling] : no joint swelling seen [Motor Tone] : muscle strength and tone were normal [Skin Color & Pigmentation] : normal skin color and pigmentation [Skin Turgor] : normal skin turgor [] : no rash [Deep Tendon Reflexes (DTR)] : deep tendon reflexes were 2+ and symmetric [Sensation] : the sensory exam was normal to light touch and pinprick [No Focal Deficits] : no focal deficits [FreeTextEntry1] : no pain on palpation right hallux

## 2019-09-05 DIAGNOSIS — S92.911A UNSPECIFIED FRACTURE OF RIGHT TOE(S), INITIAL ENCOUNTER FOR CLOSED FRACTURE: ICD-10-CM

## 2019-10-17 ENCOUNTER — APPOINTMENT (OUTPATIENT)
Dept: VASCULAR SURGERY | Facility: CLINIC | Age: 29
End: 2019-10-17
Payer: MEDICARE

## 2019-10-17 PROCEDURE — 99212 OFFICE O/P EST SF 10 MIN: CPT

## 2019-10-17 NOTE — HISTORY OF PRESENT ILLNESS
[FreeTextEntry1] : Mr. Marquez is a 29 year old gentleman with chronic bilateral leg swelling, left worse than right presenting for followup. He denies any leg cramping or pain. He has been compliant with compression stocking and swelling has improved.

## 2019-10-17 NOTE — ASSESSMENT
[FreeTextEntry1] : Mr. Marquez is a 29 year old gentleman with bilateral leg swelling. I recommend  to continue with use of compression stockings 20-30 mmHg compression to be worn daily during the day, along with lower extremity elevation. He can see me back for follow up in one years' time.

## 2019-10-17 NOTE — REVIEW OF SYSTEMS
[Limb Swelling] : limb swelling [Skin Wound] : skin wound [Negative] : Genitourinary [de-identified] : left knee and ankle

## 2022-11-03 ENCOUNTER — APPOINTMENT (OUTPATIENT)
Dept: VASCULAR SURGERY | Facility: CLINIC | Age: 32
End: 2022-11-03

## 2022-11-03 VITALS — HEIGHT: 70 IN | BODY MASS INDEX: 31.5 KG/M2 | WEIGHT: 220 LBS

## 2022-11-03 DIAGNOSIS — M79.89 OTHER SPECIFIED SOFT TISSUE DISORDERS: ICD-10-CM

## 2022-11-03 PROCEDURE — 99213 OFFICE O/P EST LOW 20 MIN: CPT

## 2022-11-03 NOTE — HISTORY OF PRESENT ILLNESS
[FreeTextEntry1] : Mr. Marquez is a 32 year old gentleman with chronic bilateral leg swelling, left worse than right presenting for followup. He denies any leg cramping or pain. He has been compliant with compression stocking and swelling has improved.

## 2022-11-03 NOTE — ASSESSMENT
[FreeTextEntry1] : Mr. Marquez is a 32 year old gentleman with bilateral leg swelling. \par \par I recommend  to continue with use of compression stockings 20-30 mmHg compression to be worn daily during the day, along with lower extremity elevation. \par \par He can see me back for follow up in one years' time.

## 2022-11-03 NOTE — REVIEW OF SYSTEMS
[Limb Swelling] : limb swelling [Skin Wound] : skin wound [Negative] : Genitourinary [de-identified] : left knee and ankle

## 2023-08-22 ENCOUNTER — NON-APPOINTMENT (OUTPATIENT)
Age: 33
End: 2023-08-22

## 2023-08-29 ENCOUNTER — NON-APPOINTMENT (OUTPATIENT)
Age: 33
End: 2023-08-29

## 2023-11-02 ENCOUNTER — APPOINTMENT (OUTPATIENT)
Dept: VASCULAR SURGERY | Facility: CLINIC | Age: 33
End: 2023-11-02
Payer: COMMERCIAL

## 2023-11-02 VITALS — HEIGHT: 70 IN | WEIGHT: 220 LBS | BODY MASS INDEX: 31.5 KG/M2

## 2023-11-02 DIAGNOSIS — I87.2 VENOUS INSUFFICIENCY (CHRONIC) (PERIPHERAL): ICD-10-CM

## 2023-11-02 PROCEDURE — 99212 OFFICE O/P EST SF 10 MIN: CPT

## 2024-11-07 ENCOUNTER — APPOINTMENT (OUTPATIENT)
Dept: VASCULAR SURGERY | Facility: CLINIC | Age: 34
End: 2024-11-07
Payer: COMMERCIAL

## 2024-11-07 VITALS — WEIGHT: 220 LBS | HEIGHT: 70 IN | BODY MASS INDEX: 31.5 KG/M2

## 2024-11-07 VITALS — DIASTOLIC BLOOD PRESSURE: 77 MMHG | SYSTOLIC BLOOD PRESSURE: 122 MMHG | HEART RATE: 88 BPM

## 2024-11-07 DIAGNOSIS — I87.2 VENOUS INSUFFICIENCY (CHRONIC) (PERIPHERAL): ICD-10-CM

## 2024-11-07 PROCEDURE — 99212 OFFICE O/P EST SF 10 MIN: CPT

## 2025-09-04 ENCOUNTER — OUTPATIENT (OUTPATIENT)
Dept: OUTPATIENT SERVICES | Facility: HOSPITAL | Age: 35
LOS: 1 days | End: 2025-09-04
Payer: COMMERCIAL

## 2025-09-04 ENCOUNTER — APPOINTMENT (OUTPATIENT)
Dept: BURN CARE | Facility: CLINIC | Age: 35
End: 2025-09-04
Payer: COMMERCIAL

## 2025-09-04 VITALS — SYSTOLIC BLOOD PRESSURE: 115 MMHG | HEART RATE: 77 BPM | DIASTOLIC BLOOD PRESSURE: 75 MMHG | OXYGEN SATURATION: 97 %

## 2025-09-04 DIAGNOSIS — Z00.8 ENCOUNTER FOR OTHER GENERAL EXAMINATION: ICD-10-CM

## 2025-09-04 PROCEDURE — 99203 OFFICE O/P NEW LOW 30 MIN: CPT
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
## 2025-09-05 DIAGNOSIS — S81.801A UNSPECIFIED OPEN WOUND, RIGHT LOWER LEG, INITIAL ENCOUNTER: ICD-10-CM
